# Patient Record
Sex: FEMALE | Race: WHITE | Employment: OTHER | URBAN - METROPOLITAN AREA
[De-identification: names, ages, dates, MRNs, and addresses within clinical notes are randomized per-mention and may not be internally consistent; named-entity substitution may affect disease eponyms.]

---

## 2017-03-23 ENCOUNTER — HOSPITAL ENCOUNTER (OUTPATIENT)
Dept: GENERAL RADIOLOGY | Age: 82
Discharge: HOME | End: 2017-03-23
Payer: MEDICARE

## 2017-03-23 DIAGNOSIS — M19.011 ARTHRITIS OF RIGHT SHOULDER REGION: ICD-10-CM

## 2017-03-23 PROCEDURE — 73030 X-RAY EXAM OF SHOULDER: CPT

## 2017-04-04 ENCOUNTER — HOSPITAL ENCOUNTER (OUTPATIENT)
Dept: CT IMAGING | Age: 82
Discharge: HOME | End: 2017-04-04
Attending: INTERNAL MEDICINE
Payer: MEDICARE

## 2017-04-04 DIAGNOSIS — R91.8 LUNG MASS: ICD-10-CM

## 2017-04-04 PROCEDURE — 71250 CT THORAX DX C-: CPT

## 2017-04-13 ENCOUNTER — DOCUMENTATION ONLY (OUTPATIENT)
Dept: ONCOLOGY | Age: 82
End: 2017-04-13

## 2017-04-13 NOTE — PROGRESS NOTES
new patient  Received: Yesterday       DO KAIN Galloway O[facility] Front Office                   Call from dr Bazan Notice about a patient   Maegan Sim [birth year]   i cannot find pt   Her phone is [phone]   She is 81 yo and has a new lung mass   Can yall find her chart and send to me? Thanks!

## 2017-04-14 ENCOUNTER — PATIENT OUTREACH (OUTPATIENT)
Dept: ONCOLOGY | Age: 82
End: 2017-04-14

## 2017-04-14 ENCOUNTER — OFFICE VISIT (OUTPATIENT)
Dept: ONCOLOGY | Age: 82
End: 2017-04-14

## 2017-04-14 VITALS
HEART RATE: 61 BPM | BODY MASS INDEX: 18.69 KG/M2 | DIASTOLIC BLOOD PRESSURE: 70 MMHG | RESPIRATION RATE: 18 BRPM | TEMPERATURE: 97.5 F | OXYGEN SATURATION: 98 % | WEIGHT: 112.2 LBS | SYSTOLIC BLOOD PRESSURE: 135 MMHG | HEIGHT: 65 IN

## 2017-04-14 DIAGNOSIS — Z87.891 HISTORY OF SMOKING GREATER THAN 50 PACK YEARS: ICD-10-CM

## 2017-04-14 DIAGNOSIS — J44.9 CHRONIC OBSTRUCTIVE PULMONARY DISEASE, UNSPECIFIED COPD TYPE (HCC): ICD-10-CM

## 2017-04-14 DIAGNOSIS — R91.8 MASS OF RIGHT LUNG: Primary | ICD-10-CM

## 2017-04-14 DIAGNOSIS — K58.9 IRRITABLE BOWEL SYNDROME, UNSPECIFIED TYPE: ICD-10-CM

## 2017-04-14 DIAGNOSIS — Z99.81 ON HOME OXYGEN THERAPY: ICD-10-CM

## 2017-04-14 RX ORDER — CEPHALEXIN 250 MG/1
CAPSULE ORAL
Refills: 1 | COMMUNITY
Start: 2017-03-08

## 2017-04-14 RX ORDER — AMLODIPINE BESYLATE 5 MG/1
TABLET ORAL
Refills: 1 | COMMUNITY
Start: 2017-02-19

## 2017-04-14 RX ORDER — ALBUTEROL SULFATE 90 UG/1
AEROSOL, METERED RESPIRATORY (INHALATION)
Refills: 1 | COMMUNITY
Start: 2017-03-08

## 2017-04-14 RX ORDER — ATORVASTATIN CALCIUM 20 MG/1
TABLET, FILM COATED ORAL
Refills: 1 | COMMUNITY
Start: 2017-02-20 | End: 2017-05-16 | Stop reason: ALTCHOICE

## 2017-04-14 RX ORDER — DICLOFENAC SODIUM 10 MG/G
GEL TOPICAL
Refills: 1 | COMMUNITY
Start: 2017-04-05

## 2017-04-14 RX ORDER — GUAIFENESIN 100 MG/5ML
81 LIQUID (ML) ORAL EVERY OTHER DAY
COMMUNITY
End: 2017-05-16 | Stop reason: ALTCHOICE

## 2017-04-14 RX ORDER — BISMUTH SUBSALICYLATE 262 MG
1 TABLET,CHEWABLE ORAL DAILY
COMMUNITY

## 2017-04-14 RX ORDER — HYDROCODONE BITARTRATE AND ACETAMINOPHEN 5; 325 MG/1; MG/1
1 TABLET ORAL
Qty: 60 TAB | Refills: 0 | Status: SHIPPED | OUTPATIENT
Start: 2017-04-14 | End: 2017-05-05 | Stop reason: ALTCHOICE

## 2017-04-14 RX ORDER — AZELASTINE HCL 205.5 UG/1
SPRAY NASAL
Refills: 1 | COMMUNITY
Start: 2017-03-08

## 2017-04-14 RX ORDER — MESALAMINE 800 MG/1
TABLET, DELAYED RELEASE ORAL
Refills: 2 | COMMUNITY
Start: 2017-03-09

## 2017-04-14 RX ORDER — CARVEDILOL 3.12 MG/1
TABLET ORAL
Refills: 0 | COMMUNITY
Start: 2017-02-09

## 2017-04-14 RX ORDER — DICLOFENAC SODIUM 50 MG/1
TABLET, DELAYED RELEASE ORAL
Refills: 0 | COMMUNITY
Start: 2017-03-23

## 2017-04-14 NOTE — Clinical Note
Message to  regarding biopsy  Need to obtain past imaging from [facility] EMERGENCY [facility] (disc) Message to . Needs follow up call Monday to determine if pain medication is effective. She is on my list for a follow up call.

## 2017-04-14 NOTE — MR AVS SNAPSHOT
Visit Information Date & Time Provider Department Dept. Phone Encounter #  
 4/14/2017 11:00 AM Kerry Turner, [address] [place] [address] Oncology at [facility] [phone] Follow-up Instructions Return in about 2 weeks (around 4/28/2017). Routing History Follow-up and Disposition History Upcoming Health Maintenance Date Due DTaP/Tdap/Td series (1 - Tdap) 3/15/1945 ZOSTER VACCINE AGE 60> 3/15/1984 GLAUCOMA SCREENING Q2Y 3/15/1989 OSTEOPOROSIS SCREENING (DEXA) 3/15/1989 Pneumococcal 65+ Low/Medium Risk (1 of 2 - PCV13) 3/15/1989 MEDICARE YEARLY EXAM 3/15/1989 INFLUENZA AGE 9 TO ADULT 8/1/2016 Allergies as of 4/14/2017  Review Complete On: 4/14/2017 By: Jasmin Pereira LPN Severity Noted Reaction Type Reactions Amoxicillin  04/14/2017    Rash Amoxicillin-pot Clavulanate  04/14/2017    Rash Ciprofloxacin  04/14/2017    Unknown (comments) Naproxen  04/14/2017    Unknown (comments) Noroxin [Norfloxacin]  04/14/2017    Rash Penicillins  04/14/2017    Unknown (comments) Sulfa (Sulfonamide Antibiotics)  04/14/2017    Unknown (comments) Current Immunizations  Never Reviewed No immunizations on file. Not reviewed this visit You Were Diagnosed With   
  
 Codes Comments Mass of right lung    -  Primary ICD-10-CM: R91.8 ICD-9-CM: 786.6 Irritable bowel syndrome, unspecified type     ICD-10-CM: K58.9 ICD-9-CM: 437.5 Chronic obstructive pulmonary disease, unspecified [facility]     ICD-10-CM: J44.9 ICD-9-CM: 898 On home oxygen therapy     ICD-10-CM: Z99.81 ICD-9-CM: V46.2 History of smoking greater than 50 pack years     ICD-10-CM: Z87.891 ICD-9-CM: V15.82 Vitals BP Pulse Temp Resp Height(growth percentile) Weight(growth percentile) 135/70 (BP 1 Location: Left arm, BP Patient Position: Sitting) 61 97.5 °F (36.4 °C) (Oral) 18 5' 5\" (1.651 m) 112 lb 3.2 oz (50.9 kg) SpO2 BMI OB Status Smoking Status 98% 18.67 kg/m2 Unknown Former Smoker Vitals History BMI and BSA Data Body Mass Index Body Surface Area  
 18.67 kg/m 2 1.53 m 2 Your Updated Medication List  
  
   
This list is accurate as of: 4/14/17 12:25 PM.  Always use your most recent med list.  
  
  
  
  
 ADVAIR DISKUS 100-50 mcg/dose diskus inhaler Generic drug:  fluticasone-salmeterol INHALE 1 PUFF BY MOUTH TWICE DAILY  
  
 amLODIPine 5 mg tablet Commonly known as:  Juan Durie TAKE 1 TABLET EVERY DAY  
  
 aspirin 81 mg chewable tablet Take 81 mg by mouth every other day. atorvastatin 20 mg tablet Commonly known as:  LIPITOR  
TAKE 1 TABLET BY MOUTH EVERY DAY Azelastine 0.15 % (205.5 mcg) nasal spray Commonly known as:  ASTEPRO  
INSTILL 2 SPRAYS INTO THE NOSTRILS TWICE DAILY  
  
 carvedilol 3.125 mg tablet Commonly known as:  COREG  
TAKE 1 TABLET BY MOUTH TWICE A DAY  
  
 * diclofenac EC 50 mg EC tablet Commonly known as:  VOLTAREN  
1 TABLET WITH FOOD OR MILK TWICE TIMES A DAY ORALLY 30 DAY(S)  
  
 * diclofenac 1 % Gel Commonly known as:  VOLTAREN  
APPLY 3 TIMES A DAY HYDROcodone-acetaminophen 5-325 mg per tablet Commonly known as:  [place] Take 1 Tab by mouth every eight (8) hours as needed for Pain. Max Daily Amount: 3 Tabs. Indications: Pain  
  
 mesalamine  mg DR tablet Commonly known as:  ASACOL HD  
TAKE 1 TABLET BY MOUTH 3 TIMES A DAY  
  
 multivitamin tablet Commonly known as:  ONE A DAY Take 1 Tab by mouth daily. PROAIR HFA 90 mcg/actuation inhaler Generic drug:  albuterol INHALE 2 PUFFS BY MOUTH EVERY 6 HOURS * Notice: This list has 2 medication(s) that are the same as other medications prescribed for you. Read the directions carefully, and ask your doctor or other care provider to review them with you. Prescriptions Printed Refills HYDROcodone-acetaminophen (NORCO) 5-325 mg per tablet 0 Sig: Take 1 Tab by mouth every eight (8) hours as needed for Pain. Max Daily Amount: 3 Tabs. Indications: Pain Class: Print Route: Oral  
  
We Performed the Following REFERRAL TO ONCOLOGY NURSE NAVIGATOR [DDS853 Custom] Comments:  
 Please evaluate patient for new lung mass/ get prior CXRs Follow-up Instructions Return in about 2 weeks (around 4/28/2017). To-Do List   
 04/14/2017 Imaging:  CT BX LUNG NDL PERC Referral Information Referral ID Referred By Referred To  
  
 [id number] Chaparrita Gilmore Not Available Visits Status Start Date End Date 1 New Request 4/14/17 4/14/18 If your referral has a status of pending review or denied, additional information will be sent to support the outcome of this decision. Introducing hospitals & HEALTH SERVICES! Sherrie Cobos introduces [employer] patient portal. Now you can access parts of your medical record, email your doctor's office, and request medication refills online. 1. In your internet browser, go to [email or web address] 2. Click on the First Time User? Click Here link in the Sign In box. You will see the New Member Sign Up page. 3. Enter your [employer] Access Code exactly as it appears below. You will not need to use this code after youve completed the sign-up process. If you do not sign up before the expiration date, you must request a new code. · [employer] Access Code: [email or web address] Expires: 6/21/2017 12:25 PM 
 
4. Enter the last four digits of your Social Security Number (xxxx) and Date of Birth (mm/dd/yyyy) as indicated and click Submit. You will be taken to the next sign-up page. 5. Create a Amiato ID. This will be your [employer]ato login ID and cannot be changed, so think of one that is secure and easy to remember. 6. Create a [employer] password. You can change your password at any time. 7. Enter your Password Reset Question and Answer. This can be used at a later time if you forget your password. 8. Enter your e-mail address. You will receive e-mail notification when new information is available in [address] 9. Click Sign Up. You can now view and download portions of your medical record. 10. Click the Download Summary menu link to download a portable copy of your medical information. If you have questions, please visit the Frequently Asked Questions section of the [employer] website. Remember, [employer] is NOT to be used for urgent needs. For medical emergencies, dial 911. Now available from your iPhone and Android! Please provide this summary of care documentation to your next provider. Your primary care clinician is listed as Gustavo Grady If you have any questions after today's visit, please call [phone].

## 2017-04-14 NOTE — PROGRESS NOTES
Carlos Eduardo Dumont is a 80 y.o. female new patient here today for upper right lobe lung cancer. Patient states she has pain 1/10 in right shoulder blade area.

## 2017-04-14 NOTE — PROGRESS NOTES
[employer]  Medical Oncology at [facility]   Nurse Navigator Note      Patient Name: Diamond Lockhart  YOB: 1924      Patient seen in consultation with Dr. Evan Guadalupe. Her daughter is present. Patient lives at [address] [place] [address]. She uses a Rolator for ambulation. Patient reports she stays active and plays tennis. Patient is a retired . Patient's grandson is an ED physician in [place], [place] and is helping support their understanding recent imaging and plan. Dr. Evan Guadalupe reviewed patient's history and recent imaging. They discussed the possibility of cancer and if patient would like to have a biopsy to prove this. Patient will move forward with a biopsy. Patient is not fearful of a cancer diagnosis. She would like to have her pain better managed. NN introduced self and role. Contact information provided. NN reviewed new patient folder and reinforced supportive therapies available through [facility]. Dr. Judi Garcia plan:  Assessment/Plan:     1. RUL lung mass on CT 6.1 cm in a long term smoker with chest wall extension/ no tissue dx of cancer.     Case d/w PCP. Reviewed scans with pt and family today.     Reviewed possibility of tissue diagnosis by CT guided biopsy.     Pt and family want to pursue doing biopsy and will order.     Pt and family want dx for prognostic info. Pt is not sure she will do anything if cancer dx.     Pt does have significant right shoulder/ chest wall pain likely related to mass.     So reasonable to figure out what dx is. Pt and family want to review any prior CXR and will see if we can get these reports.      2. Right shoulder/ chest wall pain. Local topical therapy helps some. Wants to try oral pain pill.     Ordered norco prn #60.     3. IBS/ AVR/ walker in facility. Per PCP.      Pt clinically is functional and has good QOL now.     Family support great.  Seen with navigator support today.     F/u here in 2 weeks after path back.     Call if questions. NN introduced self and role. Contact information provided. Reviewed new patient folder and discussed supportive therapies available through [facility]. Reviewed MyChart and encouraged activation for communication and to allow grandson to review medical records. Discussed Palliative Medicine and reviewed their handout. Encouraged patient to try 1/2 tab Norco to see how she tolerates this. Discussed she could take the other 1/2 if she did not see improvement in pain after an hour. We will follow up with patient regarding effectiveness of pain management plan next week. Reviewed process for scheduling biopsy with IR. Patient denies any barriers at this time. Patient and daughter verbalized understanding of above and agree to contact us with questions or concerns.

## 2017-04-14 NOTE — PROGRESS NOTES
NEW HEME/ONC CONSULT       Millie Disla is a 80 y.o. 3/15/1924 female and presents with New Patient (Upper right lobe cancer)    CC  Lung mass right 4/17    HPI  Pt seen today at request of PCP for lung mass. Pt had a shoulder xray for shoulder pain for several months which showed arthritis and a possible lung mass. CT chest without shows a RUL mass 6.1 cm. Pt is here with family today. Pt lives in [place]. Hx of AVR surgery and IBS. Uses a walker. Hx of greater than 50 yrs tobacco/ COPD and quit years ago. Pt c/o right shoulder / chest wall pain 5/10 occ. Trying topical NSAID and tylenol with not much help. Wants to try pain pill. DX   Encounter Diagnoses   Name Primary?  Mass of right lung Yes    Irritable bowel syndrome, unspecified type     Chronic obstructive pulmonary disease, unspecified COPD type ([facility]     On home oxygen therapy     History of smoking greater than 50 pack years         No past medical history on file. No past surgical history on file. Social History     Social History    Marital status:      Spouse name: N/A    Number of children: N/A    Years of education: N/A     Social History Main Topics    Smoking status: Former Smoker     Packs/day: 1.00    Smokeless tobacco: None    Alcohol use 0.6 oz/week     1 Glasses of wine per week      Comment: Once a week    Drug use: No    Sexual activity: No     Other Topics Concern    None     Social History Narrative    None     No family history on file.     Current Outpatient Prescriptions   Medication Sig Dispense Refill    PROAIR HFA 90 mcg/actuation inhaler INHALE 2 PUFFS BY MOUTH EVERY 6 HOURS  1    amLODIPine (NORVASC) 5 mg tablet TAKE 1 TABLET EVERY DAY  1    atorvastatin (LIPITOR) 20 mg tablet TAKE 1 TABLET BY MOUTH EVERY DAY  1    Azelastine (ASTEPRO) 0.15 % (205.5 mcg) nasal spray INSTILL 2 SPRAYS INTO THE NOSTRILS TWICE DAILY  1    carvedilol (COREG) 3.125 mg tablet TAKE 1 TABLET BY MOUTH TWICE A DAY  0    diclofenac (VOLTAREN) 1 % gel APPLY 3 TIMES A DAY  1    diclofenac EC (VOLTAREN) 50 mg EC tablet 1 TABLET WITH FOOD OR MILK TWICE TIMES A DAY ORALLY 30 DAY(S)  0    ADVAIR DISKUS 100-50 mcg/dose diskus inhaler INHALE 1 PUFF BY MOUTH TWICE DAILY  1    mesalamine DR (ASACOL HD) 800 mg DR tablet TAKE 1 TABLET BY MOUTH 3 TIMES A DAY  2    aspirin 81 mg chewable tablet Take 81 mg by mouth every other day.  multivitamin (ONE A DAY) tablet Take 1 Tab by mouth daily.  HYDROcodone-acetaminophen (NORCO) 5-325 mg per tablet Take 1 Tab by mouth every eight (8) hours as needed for Pain. Max Daily Amount: 3 Tabs. Indications: Pain 60 Tab 0       Allergies   Allergen Reactions    Amoxicillin Rash    Amoxicillin-Pot Clavulanate Rash    Ciprofloxacin Unknown (comments)    Naproxen Unknown (comments)    Noroxin [Norfloxacin] Rash    Penicillins Unknown (comments)    Sulfa (Sulfonamide Antibiotics) Unknown (comments)       Review of Systems    A comprehensive review of systems was negative except for: per HPI     Objective:  Visit Vitals    /70 (BP 1 Location: Left arm, BP Patient Position: Sitting)    Pulse 61    Temp 97.5 °F (36.4 °C) (Oral)    Resp 18    Ht 5' 5\" (1.651 m)    Wt 112 lb 3.2 oz (50.9 kg)    SpO2 98%    BMI 18.67 kg/m2         Physical Exam:   General appearance - frail WF in NAD  Mental status - awake, conversant  EYE-conj clear  Mouth - mucous membranes moist  Neck - supple, no significant adenopathy   Chest - clear to auscultation, no wheezes, rales or rhonchi, symmetric air entry   Heart - normal rate and regular rhythm   Abdomen - soft, nontender, nondistended, no masses or organomegaly   Ext-no pedal edema noted  Skin-Warm and dry.    Neuro -alert, oriented, normal speech, no focal findings / kyphosis/ uses walker      Diagnostic Imaging   reviewed  Results for orders placed during the hospital encounter of 03/23/17   XR SHOULDER RT AP/LAT MIN 2 V Narrative EXAM:  XR SHOULDER RT AP/LAT MIN 2 V    INDICATION:   arthritis of right shoulder. COMPARISON: Chest radiograph of 8/8/2010. FINDINGS: 2 views of the right shoulder demonstrate no fracture, dislocation or  other acute abnormality. There is hypertrophic change at the acromioclavicular  joint, with probable synovial calcification and hypertrophy along the superior  aspect of the joint. The subacromial space is preserved. There is mild to  moderate glenohumeral joint arthritis. There is asymmetric density in the lateral right lung apex. The right hilum  appears slightly elevated and these changes could be due to pleural and  parenchymal scarring. However, these findings were not clearly evident on prior  chest radiographs, at which time there was also slight elevation of the right  hilum. Therefore, findings are of concern for a right apical mass. There does  not appear to be gross associated bone destruction. Impression IMPRESSION:    1. No acute findings in the right shoulder. 2. Right acromioclavicular joint hypertrophy and glenohumeral joint hypertrophy. 3. Possible right apical lung mass. CT of the chest is recommended for further  evaluation. 23X             Results for orders placed during the hospital encounter of 04/04/17   CT CHEST WO CONT    Narrative INDICATION: LUNG MASS     Noncontrast CT of the chest is performed with 5 mm collimation. Coronal and  sagittal reformatted images were also performed. CT dose reduction was achieved through use of a standardized protocol tailored  for this examination and automatic exposure control for dose modulation. Direct comparison is made to prior chest x-ray dated August 2010. Chest:     Lungs: Within the right upper lung, there is patchy airspace consolidation with  air bronchograms.  There is peripheral right upper lung masslike consolidation  which extends laterally into the chest wall; conglomerate measurement is 3.9 cm  x 6.1 cm. The right lateral second rib which extends to this masslike  consolidation is somewhat mottled in appearance. There are severe bilateral  emphysematous changes. Several calcified granulomas are noted within the left  lung. Lymph nodes: There is no axillary, mediastinal or hilar lymphadenopathy. Heart: The heart is of normal size and there is no pericardial effusion. Pleura: There is no pleural fluid. Upper abdomen: There are multiple calcified granulomas within the spleen. Several hepatic granulomas are noted as well. There is bilateral renal cortical  scarring. There is a chronic calcification within the left adrenal gland. Impression IMPRESSION: Right upper lung patchy airspace consolidation and right upper lung  masslike consolidation which extends through the right lateral chest wall and  measures approximately 3.9 cm x 6.1 cm. Right lateral rib which extends to the  region of masslike consolidation has a somewhat mottled appearance. Findings are  worrisome for neoplastic process. Recommend correlation with patient history as  well as any prior cross-sectional imaging. CT PET scan can be performed for  further evaluation. Lesion is amenable to CT-guided percutaneous biopsy.     23x         Lab Results  reviewed  Lab Results   Component Value Date/Time    WBC 7.8 08/18/2010 04:20 AM    HGB 10.5 08/18/2010 04:20 AM    HCT 31.4 08/18/2010 04:20 AM    PLATELET 851 58/64/5532 04:20 AM    MCV 86.0 08/18/2010 04:20 AM       Lab Results   Component Value Date/Time    Sodium 137 08/18/2010 04:20 AM    Potassium 3.8 08/18/2010 04:20 AM    Chloride 101 08/18/2010 04:20 AM    CO2 22 08/18/2010 04:20 AM    Anion gap 14 08/18/2010 04:20 AM    Glucose 181 08/18/2010 04:20 AM    BUN 28 08/18/2010 04:20 AM    Creatinine 1.6 08/18/2010 04:20 AM    BUN/Creatinine ratio 18 08/18/2010 04:20 AM    GFR est AA 39 08/18/2010 04:20 AM    GFR est non-AA 32 08/18/2010 04:20 AM    Calcium 9.4 08/18/2010 04:20 AM AST (SGOT) 16 08/18/2010 04:20 AM    Alk. phosphatase 96 08/18/2010 04:20 AM    Protein, total 7.2 08/18/2010 04:20 AM    Albumin 3.4 08/18/2010 04:20 AM    Globulin 3.8 08/18/2010 04:20 AM    A-G Ratio 0.9 08/18/2010 04:20 AM    ALT (SGPT) 17 08/18/2010 04:20 AM       .    Assessment/Plan:    1.  RUL lung mass on CT 6.1 cm in a long term smoker with chest wall extension/ no tissue dx of cancer. Case d/w PCP. Reviewed scans with pt and family today. Reviewed possibility of tissue diagnosis by CT guided biopsy. Pt and family want to pursue doing biopsy and will order. Pt and family want dx for prognostic info. Pt is not sure she will do anything if cancer dx. Pt does have significant right shoulder/ chest wall pain likely related to mass. So reasonable to figure out what dx is. Pt and family want to review any prior CXR and will see if we can get these reports. 2.  Right shoulder/ chest wall pain. Local topical therapy helps some. Wants to try oral pain pill. Ordered norco prn #60.    3. IBS/ AVR/ walker in facility. Per PCP. Pt clinically is functional and has good QOL now. Family support great. Seen with navigator support today. F/u here in 2 weeks after path back. Call if questions. ICD-10-CM ICD-9-CM    1. Mass of right lung R91.8 786.6 REFERRAL TO ONCOLOGY NURSE NAVIGATOR      CT BX LUNG NDL PERC   2. Irritable bowel syndrome, unspecified type K58.9 564.1 REFERRAL TO ONCOLOGY NURSE NAVIGATOR      CT BX LUNG NDL PERC   3. Chronic obstructive pulmonary disease, unspecified COPD type ([facility] J44.9 496 REFERRAL TO ONCOLOGY NURSE NAVIGATOR      CT BX LUNG NDL PERC   4. On home oxygen therapy Z99.81 V46.2 REFERRAL TO ONCOLOGY NURSE NAVIGATOR      CT BX LUNG NDL PERC   5.  History of smoking greater than 50 pack years Z87.891 V15.82 REFERRAL TO ONCOLOGY NURSE NAVIGATOR      CT BX LUNG NDL PERC       Current Outpatient Prescriptions   Medication Sig    PROAIR HFA 90 mcg/actuation inhaler INHALE 2 PUFFS BY MOUTH EVERY 6 HOURS    amLODIPine (NORVASC) 5 mg tablet TAKE 1 TABLET EVERY DAY    atorvastatin (LIPITOR) 20 mg tablet TAKE 1 TABLET BY MOUTH EVERY DAY    Azelastine (ASTEPRO) 0.15 % (205.5 mcg) nasal spray INSTILL 2 SPRAYS INTO THE NOSTRILS TWICE DAILY    carvedilol (COREG) 3.125 mg tablet TAKE 1 TABLET BY MOUTH TWICE A DAY    diclofenac (VOLTAREN) 1 % gel APPLY 3 TIMES A DAY    diclofenac EC (VOLTAREN) 50 mg EC tablet 1 TABLET WITH FOOD OR MILK TWICE TIMES A DAY ORALLY 30 DAY(S)    ADVAIR DISKUS 100-50 mcg/dose diskus inhaler INHALE 1 PUFF BY MOUTH TWICE DAILY    mesalamine DR (ASACOL HD) 800 mg DR tablet TAKE 1 TABLET BY MOUTH 3 TIMES A DAY    aspirin 81 mg chewable tablet Take 81 mg by mouth every other day.  multivitamin (ONE A DAY) tablet Take 1 Tab by mouth daily.  HYDROcodone-acetaminophen (NORCO) 5-325 mg per tablet Take 1 Tab by mouth every eight (8) hours as needed for Pain. Max Daily Amount: 3 Tabs. Indications: Pain     No current facility-administered medications for this visit. There are no Patient Instructions on file for this visit. Follow-up Disposition:  Return in about 2 weeks (around 4/28/2017).     Marissa Sahu DO

## 2017-04-20 ENCOUNTER — HOSPITAL ENCOUNTER (OUTPATIENT)
Dept: CT IMAGING | Age: 82
Discharge: HOME | End: 2017-04-20
Attending: INTERNAL MEDICINE
Payer: MEDICARE

## 2017-04-20 VITALS
WEIGHT: 110 LBS | OXYGEN SATURATION: 98 % | SYSTOLIC BLOOD PRESSURE: 139 MMHG | HEART RATE: 70 BPM | RESPIRATION RATE: 16 BRPM | DIASTOLIC BLOOD PRESSURE: 29 MMHG | BODY MASS INDEX: 18.78 KG/M2 | HEIGHT: 64 IN

## 2017-04-20 DIAGNOSIS — J44.9 CHRONIC OBSTRUCTIVE PULMONARY DISEASE, UNSPECIFIED COPD TYPE (HCC): ICD-10-CM

## 2017-04-20 DIAGNOSIS — K58.9 IRRITABLE BOWEL SYNDROME, UNSPECIFIED TYPE: ICD-10-CM

## 2017-04-20 DIAGNOSIS — Z99.81 ON HOME OXYGEN THERAPY: ICD-10-CM

## 2017-04-20 DIAGNOSIS — R91.8 MASS OF RIGHT LUNG: ICD-10-CM

## 2017-04-20 DIAGNOSIS — Z87.891 HISTORY OF SMOKING GREATER THAN 50 PACK YEARS: ICD-10-CM

## 2017-04-20 PROCEDURE — 74011250636 HC RX REV CODE- 250/636

## 2017-04-20 PROCEDURE — 88341 IMHCHEM/IMCYTCHM EA ADD ANTB: CPT | Performed by: INTERNAL MEDICINE

## 2017-04-20 PROCEDURE — 77030018781

## 2017-04-20 PROCEDURE — 77030003666 HC NDL SPINAL BD -A

## 2017-04-20 PROCEDURE — 77030003503 HC NDL BIOP TISS BD -B

## 2017-04-20 PROCEDURE — 74011250636 HC RX REV CODE- 250/636: Performed by: RADIOLOGY

## 2017-04-20 PROCEDURE — 88342 IMHCHEM/IMCYTCHM 1ST ANTB: CPT | Performed by: INTERNAL MEDICINE

## 2017-04-20 PROCEDURE — 88377 M/PHMTRC ALYS ISHQUANT/SEMIQ: CPT | Performed by: INTERNAL MEDICINE

## 2017-04-20 PROCEDURE — 88173 CYTOPATH EVAL FNA REPORT: CPT | Performed by: INTERNAL MEDICINE

## 2017-04-20 PROCEDURE — 32405 CT BX LUNG NDL PERC: CPT

## 2017-04-20 RX ORDER — FENTANYL CITRATE 50 UG/ML
200 INJECTION, SOLUTION INTRAMUSCULAR; INTRAVENOUS
Status: DISCONTINUED | OUTPATIENT
Start: 2017-04-20 | End: 2017-04-20

## 2017-04-20 RX ORDER — SODIUM CHLORIDE 0.9 % (FLUSH) 0.9 %
10 SYRINGE (ML) INJECTION AS NEEDED
Status: DISCONTINUED | OUTPATIENT
Start: 2017-04-20 | End: 2017-04-20

## 2017-04-20 RX ORDER — MIDAZOLAM HYDROCHLORIDE 1 MG/ML
5 INJECTION, SOLUTION INTRAMUSCULAR; INTRAVENOUS
Status: DISCONTINUED | OUTPATIENT
Start: 2017-04-20 | End: 2017-04-20

## 2017-04-20 RX ORDER — SODIUM CHLORIDE 9 MG/ML
500 INJECTION, SOLUTION INTRAVENOUS CONTINUOUS
Status: DISCONTINUED | OUTPATIENT
Start: 2017-04-20 | End: 2017-04-20

## 2017-04-20 RX ORDER — MIDAZOLAM HYDROCHLORIDE 1 MG/ML
INJECTION, SOLUTION INTRAMUSCULAR; INTRAVENOUS
Status: DISCONTINUED
Start: 2017-04-20 | End: 2017-04-20

## 2017-04-20 RX ADMIN — MIDAZOLAM HYDROCHLORIDE 1 MG: 1 INJECTION, SOLUTION INTRAMUSCULAR; INTRAVENOUS at 13:31

## 2017-04-20 RX ADMIN — FENTANYL CITRATE 25 MCG: 50 INJECTION, SOLUTION INTRAMUSCULAR; INTRAVENOUS at 13:18

## 2017-04-20 RX ADMIN — MIDAZOLAM HYDROCHLORIDE 1 MG: 1 INJECTION, SOLUTION INTRAMUSCULAR; INTRAVENOUS at 13:48

## 2017-04-20 RX ADMIN — MIDAZOLAM HYDROCHLORIDE 0.5 MG: 1 INJECTION, SOLUTION INTRAMUSCULAR; INTRAVENOUS at 13:50

## 2017-04-20 RX ADMIN — SODIUM CHLORIDE 500 ML: 900 INJECTION, SOLUTION INTRAVENOUS at 13:18

## 2017-04-20 RX ADMIN — FENTANYL CITRATE 25 MCG: 50 INJECTION, SOLUTION INTRAMUSCULAR; INTRAVENOUS at 13:50

## 2017-04-20 RX ADMIN — FENTANYL CITRATE 50 MCG: 50 INJECTION, SOLUTION INTRAMUSCULAR; INTRAVENOUS at 13:26

## 2017-04-20 NOTE — DISCHARGE INSTRUCTIONS
[address] [facility]  Department of Interventional Radiology  [facility]  [phone]  BIOPSY DISCHARGE INSTRUCTIONS    General Instructions:     A biopsy is the removal of a small piece of tissue for microscopic examination or testing. Healthy tissue can be obtained for the purpose of tissue-type matching for transplants. Unhealthy tissues are more commonly biopsied to diagnose disease. General Biopsy:     A mass can grow in any area of the body, and we are taking a specimen as ordered by your doctor. The risks are the same. They include bleeding, pain, and infection. Home Care Instructions: You may resume your regular diet and medication regimen. Do not drink alcohol, drive, or make any important legal decisions in the next 24 hours. Do not lift anything heavier than a gallon of milk until the soreness goes away. You may use over the counter acetaminophen or ibuprofen for the soreness. You may apply an ice pack to the affected area for 20-30 minutes at time for the first 24 hours. After that, you may apply a heat pack. Call If: You should call your Physician and/or the Radiology Nurse if you have any questions or concerns about the biopsy site. Call if you should have increased pain, fever, redness, drainage, or bleeding more than a small spot on the bandage. Follow-Up Instructions: Please see your ordering doctor as he/she has requested. To Reach Us: Side effects of sedation medications and other medications used today have been reviewed. Notify us of nausea, itching, hives, dizziness, or anything else out of the ordinary. Should you experience any of these significant changes, please call [phone] between the hours of 7:30 am and 10 pm or [phone] after hours.  After hours, ask the  to page the [address] Technologist, and describe the problem to the technologist.            Patient Signature:  Date: 4/20/2017  Discharging Nurse: Kishor Ramirez RN

## 2017-04-25 ENCOUNTER — PATIENT OUTREACH (OUTPATIENT)
Dept: ONCOLOGY | Age: 82
End: 2017-04-25

## 2017-04-25 NOTE — PROGRESS NOTES
[address]  Medical Oncology at [facility]   Nurse Navigator Note          Daughter had sent following message via [employer]:  My mother is tolerating the pain medication well. Beny Amezquita is being very stoic and miserly with dosage and is feeling significant pain in between dosages.  My brothers and I try to let her make her own decisions and encourage her to take the medicine when she needs it rather than stretching it out to 8 hours when she is in obvious pain.  We would really appreciate your calling her to encourage her to take the pain meds based on how she feels. Shadi Ek number is [phone]. Beny Amezquita is also concerned that she may run out before next Friday (5/5) at her next appointment. Dianna Blakely will happily come by the office and  another prescription if she needs it.     Thanks, [address] [facility] with patient. She is taking her pain medication every 8 hours. Her right shoulder pain continues. Prior to taking her pain medication her pain is a level 7 an hour after taking Norco her pain is level 5 and stays there for hours then creeps back up to 7. She has reduced her activity due to the pain and is skipping trips to weekly chair exercises. Patient last BM was yesterday, but it had been days since she had gone. Her stool was hard. Encouraged patient start Miralax as normally recommended by provider. Patient will start today. Reviewed importance of preventing constipation while on opioids and need to take daily med to prevent constipation. NN to forward above to provider and nursing for further directions for pain relief.

## 2017-04-26 ENCOUNTER — DOCUMENTATION ONLY (OUTPATIENT)
Dept: ONCOLOGY | Age: 82
End: 2017-04-26

## 2017-04-26 NOTE — PROGRESS NOTES
Received call from patient. She reports she is continuing to have pain. She picked up the \"powder\" for constipation. She woke up at 3AM this morning in pain. She took 1/2 tablet of hydrocodone and 1 teaspoon of the powder. She had a large bowel movement at 9:00 this morning. She took 1/2 hydrocodone at 1 tsp Miralax at 9:00 as well. She reports now at 4:30, she took another pill of hydrocodone due to continued shoulder pain. She is planning to take a full tablet of hydrocodone again before going to bed tonight. She reports it is controlling the pain when she takes a full tablet of hydrocodone but is concerned that her appointment here is not until 5/5/17. She reports a full tablet sometimes makes her feel a little drowsy but she does not feel unsteady or weak and feels she is tolerating it well. She is also doing Voltaran three times daily. Encouraged to continue hydrocodone-acetaminophen 1 tablet every 8 hours as needed for pain as prescribed if this is helping. Encouraged daily Miralax which patient started today. She would like an appointment earlier next week. Will call back tomorrow to discuss appointment time further.

## 2017-04-30 ENCOUNTER — DOCUMENTATION ONLY (OUTPATIENT)
Dept: ONCOLOGY | Age: 82
End: 2017-04-30

## 2017-04-30 NOTE — PROGRESS NOTES
Called on call doctor and asked if could take pain pill early  Check on pt / pain mgmt  Maybe need palliative care

## 2017-05-01 ENCOUNTER — DOCUMENTATION ONLY (OUTPATIENT)
Dept: ONCOLOGY | Age: 82
End: 2017-05-01

## 2017-05-01 DIAGNOSIS — R91.8 MASS OF RIGHT LUNG: Primary | ICD-10-CM

## 2017-05-01 NOTE — PROGRESS NOTES
HIPAA verified. Stated has pain right shoulder and is hesitant to take norco due to side effects. Describes pain as intermittent and sharp/3-4 now. Stated is too early for pain pill. Discussed palliative care referral and patient is agreeable. Gave palliative care contact info/need referral.  Advised would forward to provider.

## 2017-05-02 ENCOUNTER — PATIENT OUTREACH (OUTPATIENT)
Dept: ONCOLOGY | Age: 82
End: 2017-05-02

## 2017-05-02 ENCOUNTER — TELEPHONE (OUTPATIENT)
Dept: ONCOLOGY | Age: 82
End: 2017-05-02

## 2017-05-02 NOTE — Clinical Note
Gwyn Forbes, patient comes in this Friday. If you have a chance to meet with her that would be great.

## 2017-05-02 NOTE — TELEPHONE ENCOUNTER
Patient is independently living at [facility], when the nurse checked on her this morning around 0730 she was in extreme pain. She does not have an appetite, she is using oxygen, and she took 1 Tramadol this morning. Does not want to go to the hospital. Would like to speak with the nurse before any treatment.   Thank you,  Ny Parra

## 2017-05-02 NOTE — PROGRESS NOTES
Consulted with NP, Alma Reyna.  Patient may take Norco 5/325 every 6 hours as needed. Spoke with patient and reviewed above. Patient repeated instructions back. She understands she should contact our office if pain is unmanaged with this change.

## 2017-05-02 NOTE — PROGRESS NOTES
[address] [facility] Oncology at [facility]   Nurse Navigator Note        Patient had left message for NN requesting a call back regarding scheduling late afternoon 5/1/17. NN reviewed patient's chart and noted call regarding pain management issue. NN spoke with NP and agreed NN would contact pt to evaluate. Patient reports normal pain in back and shoulder. She denies taking tramadol. She believes Tramadol gives her diarrhea. Patient reports she is taking her Norco as prescribed. Patient states after she takes one tab of Norco her pain is relieved for about 4-6 hours. Patient's last BM today. \"I do not want to go to the hospital.\" Patient stated the NP at her assisted living facility had recommended she go to the hospital.  Patient stated her only other issue beside pain returning prior to scheduled time for pain med is a diminished appetite. Patient reported she only likes eating granola bars. NN reviewed referral to Palliative Medicine and how they can support patient with symptom management. NN reinforced importance of protein in patient's diet. Discussed many bars available that have additional protein. NN to forward referral to dietician for support. NN to review above with provider and discuss possibility of frequency change. NN reviewed upcoming appointment with patient. Patient is anxious to meet with provider and discuss findings. Patient will bring her son and daughter to upcoming appointment. Our office to contact patient today before close of business regarding opioid scheduling. Patient verbalized understanding and appreciation.

## 2017-05-04 ENCOUNTER — DOCUMENTATION ONLY (OUTPATIENT)
Dept: ONCOLOGY | Age: 82
End: 2017-05-04

## 2017-05-04 NOTE — TELEPHONE ENCOUNTER
Spoke with Sahra Rosen, nurse at [facility]. She reports she assessed patient this morning & pain was controlled at that time. Nurse feels patient is not taking pain medication routinely. States she spoke with patients daughter this morning about need to transition patient to healthcare unit of facility. Patient is living independently at present & could benefit from staff assist in healthcare. Nurse reports patient refuses to wear oxygen when she leaves facility for outings. States patient had removed oxygen during night last night & upon assessment this morning, 02 sat was 67% on room air. Oxygen replaced & 02 sat to 98% within a few minutes per nurse. Advised nurse of patient's scheduled office visit here tomorrow & per provider, patient may increase pain medication to every 4-6 hours if needed. Nurse verbalized understanding.

## 2017-05-04 NOTE — TELEPHONE ENCOUNTER
Call returned to nurse Lizet at [facility]. Message left on voicemail to return call to office. Per provider, patient may increase frequency of Hydrocodone/Acet. 5/325 mg to one tab every 4-6 hours. Patient scheduled for office visit tomorrow. Call placed to patient to check on condition. Message left to return call to office.

## 2017-05-04 NOTE — TELEPHONE ENCOUNTER
Nadine, the nurse from [address] [place] [address] called stating she has noticed patient has had increased pain and increased weakness.  Please call Ahsan Smith at [phone]

## 2017-05-05 ENCOUNTER — PATIENT OUTREACH (OUTPATIENT)
Dept: ONCOLOGY | Age: 82
End: 2017-05-05

## 2017-05-05 ENCOUNTER — OFFICE VISIT (OUTPATIENT)
Dept: ONCOLOGY | Age: 82
End: 2017-05-05

## 2017-05-05 ENCOUNTER — DOCUMENTATION ONLY (OUTPATIENT)
Dept: ONCOLOGY | Age: 82
End: 2017-05-05

## 2017-05-05 VITALS
RESPIRATION RATE: 16 BRPM | WEIGHT: 108 LBS | DIASTOLIC BLOOD PRESSURE: 51 MMHG | BODY MASS INDEX: 18.44 KG/M2 | SYSTOLIC BLOOD PRESSURE: 138 MMHG | HEART RATE: 61 BPM | TEMPERATURE: 97.9 F | HEIGHT: 64 IN | OXYGEN SATURATION: 92 %

## 2017-05-05 DIAGNOSIS — C34.11 MALIGNANT NEOPLASM OF UPPER LOBE OF RIGHT LUNG (HCC): Primary | ICD-10-CM

## 2017-05-05 DIAGNOSIS — M79.601 RIGHT ARM PAIN: ICD-10-CM

## 2017-05-05 DIAGNOSIS — M25.511 ACUTE PAIN OF RIGHT SHOULDER: ICD-10-CM

## 2017-05-05 DIAGNOSIS — Z99.81 ON HOME OXYGEN THERAPY: ICD-10-CM

## 2017-05-05 RX ORDER — OXYCODONE HYDROCHLORIDE 5 MG/1
5 CAPSULE ORAL
Qty: 100 CAP | Refills: 0 | Status: SHIPPED | OUTPATIENT
Start: 2017-05-05

## 2017-05-05 NOTE — PROGRESS NOTES
HEME/ONC CONSULT       Quique Granados is a 80 y.o. 3/15/1924 female and presents with Lung Cancer    CC  Lung mass right 4/17    HPI  Pt seen today for f/u and new lung cancer by FNA of rib lesion. 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 Family / pt here to discuss today. Pt is unsure about what therapy she would want to do. Does not want chemo. Considering palliative care/ hospice. In assisted living at THE [facility]. Last visit: request of PCP for lung mass. Pt had a shoulder xray for shoulder pain for several months which showed arthritis and a possible lung mass. CT chest without shows a RUL mass 6.1 cm. Pt is here with family today. Pt [facility] in [facility]. Hx of AVR surgery and IBS. Uses a walker. Hx of greater than 50 yrs tobacco/ COPD and quit years ago. Pt c/o right shoulder / chest wall pain 5/10 occ. Trying topical NSAID and tylenol with not much help. Wants to try pain pill. DX   Encounter Diagnoses   Name Primary?  Malignant neoplasm of upper lobe of right lung (HCC) Yes    Acute pain of right shoulder     Right arm pain     On home oxygen therapy         No past medical history on file. No past surgical history on file. Social History     Social History    Marital status:      Spouse name: N/A    Number of children: N/A    Years of education: N/A     Social History Main Topics    Smoking status: Former Smoker     Packs/day: 1.00    Smokeless tobacco: None    Alcohol use 0.6 oz/week     1 Glasses of wine per week      Comment: Once a week    Drug use: No    Sexual activity: No     Other Topics Concern    None     Social History Narrative     No family history on file. Current Outpatient Prescriptions   Medication Sig Dispense Refill    oxyCODONE (OXYIR) 5 mg capsule Take 1 Cap by mouth every four (4) hours as needed. Max Daily Amount: 30 mg.  Indications: Pain 100 Cap 0    amLODIPine (NORVASC) 5 mg tablet TAKE 1 TABLET EVERY DAY  1    atorvastatin (LIPITOR) 20 mg tablet TAKE 1 TABLET BY MOUTH EVERY DAY  1    carvedilol (COREG) 3.125 mg tablet TAKE 1 TABLET BY MOUTH TWICE A DAY  0    diclofenac (VOLTAREN) 1 % gel APPLY 3 TIMES A DAY  1    diclofenac EC (VOLTAREN) 50 mg EC tablet 1 TABLET WITH FOOD OR MILK TWICE TIMES A DAY ORALLY 30 DAY(S)  0    mesalamine DR (ASACOL HD) 800 mg DR tablet TAKE 1 TABLET BY MOUTH 3 TIMES A DAY  2    aspirin 81 mg chewable tablet Take 81 mg by mouth every other day.  multivitamin (ONE A DAY) tablet Take 1 Tab by mouth daily.  PROAIR HFA 90 mcg/actuation inhaler INHALE 2 PUFFS BY MOUTH EVERY 6 HOURS  1    Azelastine (ASTEPRO) 0.15 % (205.5 mcg) nasal spray INSTILL 2 SPRAYS INTO THE NOSTRILS TWICE DAILY  1    ADVAIR DISKUS 100-50 mcg/dose diskus inhaler INHALE 1 PUFF BY MOUTH TWICE DAILY  1       Allergies   Allergen Reactions    Amoxicillin Rash    Amoxicillin-Pot Clavulanate Rash    Ciprofloxacin Unknown (comments)    Naproxen Unknown (comments)    Noroxin [Norfloxacin] Rash    Penicillins Unknown (comments)    Sulfa (Sulfonamide Antibiotics) Unknown (comments)       Review of Systems    A comprehensive review of systems was negative except for: per HPI     Objective:  Visit Vitals    /51    Pulse 61    Temp 97.9 °F (36.6 °C) (Oral)    Resp 16    Ht 5' 4\" (1.626 m)    Wt 108 lb (49 kg)    SpO2 92%    BMI 18.54 kg/m2         Physical Exam:   General appearance - frail WF in NAD  Mental status - awake, conversant  EYE-conj clear  Mouth - mucous membranes moist  Neck - supple, no significant adenopathy   Chest - clear to auscultation, no wheezes, rales or rhonchi, symmetric air entry   Heart - normal rate and regular rhythm   Abdomen - soft, nontender, nondistended, no masses or organomegaly   Ext-no pedal edema noted/ right arm pain with movement/ minimal weakness RUE  Skin-Warm and dry.    Neuro -alert, oriented, normal speech, no focal findings / kyphosis/ uses walker      Diagnostic Imaging   reviewed  Results for orders placed during the hospital encounter of 03/23/17   XR SHOULDER RT AP/LAT MIN 2 V    Narrative EXAM:  XR SHOULDER RT AP/LAT MIN 2 V    INDICATION:   arthritis of right shoulder. COMPARISON: Chest radiograph of 8/8/2010. FINDINGS: 2 views of the right shoulder demonstrate no fracture, dislocation or  other acute abnormality. There is hypertrophic change at the acromioclavicular  joint, with probable synovial calcification and hypertrophy along the superior  aspect of the joint. The subacromial space is preserved. There is mild to  moderate glenohumeral joint arthritis. There is asymmetric density in the lateral right lung apex. The right hilum  appears slightly elevated and these changes could be due to pleural and  parenchymal scarring. However, these findings were not clearly evident on prior  chest radiographs, at which time there was also slight elevation of the right  hilum. Therefore, findings are of concern for a right apical mass. There does  not appear to be gross associated bone destruction. Impression IMPRESSION:    1. No acute findings in the right shoulder. 2. Right acromioclavicular joint hypertrophy and glenohumeral joint hypertrophy. 3. Possible right apical lung mass. CT of the chest is recommended for further  evaluation. 23X             Results for orders placed during the hospital encounter of 04/20/17   CT BX LUNG NDL PERC    Narrative CLINICAL HISTORY: Destructive mass of the right second rib, with extension into  the right chest wall and right pleural space. COMPARISON: CT chest 4/7/2017. PROCEDURE: CT-guided right chest wall mass biopsy. CT dose reduction was  achieved through use of a standardized protocol tailored for this examination  and automatic exposure control for dose modulation.  [Billing note only, not part  of diagnostic report: DMPI - though ordered as a right upper lobe lung mass  biopsy, the mass is in the right chest wall, and the lung was not entered. Please bill for right chest wall mass biopsy.]    OPERATORS: Tammi Murillo M.D.     ESTIMATED BLOOD LOSS: None. ANESTHESIA: buffered lidocaine local anesthetic. The patient was evaluated and  felt to be an appropriate candidate for conscious sedation. Please see  preprocedure assessment and nursing record for full documentation. Sedation was  achieved with Versed and Fentanyl, and continuous monitoring was performed. TECHNIQUE AND FINDINGS:  The patient's relevant allergies, medications, laboratory results, and history  were reviewed with the patient and her son. The patient was identified by name  and date of birth. The procedure, benefits, risks, and alternatives (including  not having the procedure) were discussed. All of the patient's questions were  answered. Informed verbal and written consent was obtained. Focused CT was performed of the upper chest, localizing the destructive right  second rib mass. The skin site was marked. A time-out procedure using two  patient identifiers was performed and laterality confirmed. An anterior right  upper chest approach was chosen and the skin was prepped and draped using  maximum sterile barrier. Subcutaneous and deep tissues were heavily anesthetized  with local anesthetic (approximately 10 cc). An 18-gauge trocar needle was  advanced into the mass, and this was extremely painful. Additional local  anesthetic was provided, though repeat attempt was also painful. The patient was  sedated. Under CT fluoroscopic guidance, 3 fine-needle aspirates were obtained  with 22-gauge spinal needles. Under CT-fluoroscopic guidance, 2 core biopsy  samples were obtained with an 18-gauge core biopsy device, submitted to the  on-site pathologist, and sample adequacy confirmed. The first sample measured  approximately 20 mm and the second measured approximately 12 mm. There were no  immediate complications.  The patient tolerated the procedure well. Postprocedure diagnosis: Adenocarcinoma. Impression IMPRESSION:  Uncomplicated CT-guided right second rib mass biopsy. Lab Results  reviewed  Lab Results   Component Value Date/Time    WBC 7.8 08/18/2010 04:20 AM    HGB 10.5 08/18/2010 04:20 AM    HCT 31.4 08/18/2010 04:20 AM    PLATELET 778 62/87/4921 04:20 AM    MCV 86.0 08/18/2010 04:20 AM       Lab Results   Component Value Date/Time    Sodium 137 08/18/2010 04:20 AM    Potassium 3.8 08/18/2010 04:20 AM    Chloride 101 08/18/2010 04:20 AM    CO2 22 08/18/2010 04:20 AM    Anion gap 14 08/18/2010 04:20 AM    Glucose 181 08/18/2010 04:20 AM    BUN 28 08/18/2010 04:20 AM    Creatinine 1.6 08/18/2010 04:20 AM    BUN/Creatinine ratio 18 08/18/2010 04:20 AM    GFR est AA 39 08/18/2010 04:20 AM    GFR est non-AA 32 08/18/2010 04:20 AM    Calcium 9.4 08/18/2010 04:20 AM    AST (SGOT) 16 08/18/2010 04:20 AM    Alk. phosphatase 96 08/18/2010 04:20 AM    Protein, total 7.2 08/18/2010 04:20 AM    Albumin 3.4 08/18/2010 04:20 AM    Globulin 3.8 08/18/2010 04:20 AM    A-G Ratio 0.9 08/18/2010 04:20 AM    ALT (SGPT) 17 08/18/2010 04:20 AM       .    Assessment/Plan:    1. 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 FNA of rib lesion shows met adenocarcinoma c/w lung primary. Reviewed path with pt and family today. Markers ordered on 4/20 but not done yet (to see if any pill options). Pt does have significant right shoulder/ chest wall / arm pain likely related to mass. Pain has gotten worse over time and pain meds helping but not completely. Long d/w family today about next step. Reviewed no surgery as pt does not want this. Reviewed palliative radiation and possible targeted therapy options if markers good. Markers pending. Pt and family want to d/w rad/onc and will refer.     Also will refer to palliative care as we openly discussed possible hospice. Will get navigator today to help with coordinations of services for pain mgmt. 2.  Right shoulder/ chest wall pain. Local topical therapy helps some. Tried norco and not strong enough. rx for oxycodone 5mg q4h prn. Pt is concerned about taking too much pain meds. 3.  IBS/ AVR/ walker in facility/ in independent living. Per PCP. Family support great. Navigator help today. F/u here in 4 weeks if needed    Call if questions. ICD-10-CM ICD-9-CM    1. Malignant neoplasm of upper lobe of right lung (HCC) C34.11 162.3 REFERRAL TO ONCOLOGY NURSE NAVIGATOR      REFERRAL TO RADIATION ONCOLOGY      REFERRAL TO PALLIATIVE MEDICINE   2. Acute pain of right shoulder M25.511 719.41 REFERRAL TO RADIATION ONCOLOGY      REFERRAL TO PALLIATIVE MEDICINE   3. Right arm pain M79.601 729.5 REFERRAL TO RADIATION ONCOLOGY      REFERRAL TO PALLIATIVE MEDICINE   4. On home oxygen therapy Z99.81 V46.2 REFERRAL TO PALLIATIVE MEDICINE       Current Outpatient Prescriptions   Medication Sig    oxyCODONE (OXYIR) 5 mg capsule Take 1 Cap by mouth every four (4) hours as needed. Max Daily Amount: 30 mg. Indications: Pain    amLODIPine (NORVASC) 5 mg tablet TAKE 1 TABLET EVERY DAY    atorvastatin (LIPITOR) 20 mg tablet TAKE 1 TABLET BY MOUTH EVERY DAY    carvedilol (COREG) 3.125 mg tablet TAKE 1 TABLET BY MOUTH TWICE A DAY    diclofenac (VOLTAREN) 1 % gel APPLY 3 TIMES A DAY    diclofenac EC (VOLTAREN) 50 mg EC tablet 1 TABLET WITH FOOD OR MILK TWICE TIMES A DAY ORALLY 30 DAY(S)    mesalamine DR (ASACOL HD) 800 mg DR tablet TAKE 1 TABLET BY MOUTH 3 TIMES A DAY    aspirin 81 mg chewable tablet Take 81 mg by mouth every other day.  multivitamin (ONE A DAY) tablet Take 1 Tab by mouth daily.     PROAIR HFA 90 mcg/actuation inhaler INHALE 2 PUFFS BY MOUTH EVERY 6 HOURS    Azelastine (ASTEPRO) 0.15 % (205.5 mcg) nasal spray INSTILL 2 SPRAYS INTO THE NOSTRILS TWICE DAILY    ADVAIR DISKUS 100-50 mcg/dose diskus inhaler INHALE 1 PUFF BY MOUTH TWICE DAILY     No current facility-administered medications for this visit. There are no Patient Instructions on file for this visit. Follow-up Disposition:  Return in about 4 weeks (around 6/2/2017).     Abbey Cuenca DO

## 2017-05-08 ENCOUNTER — TELEPHONE (OUTPATIENT)
Dept: PALLATIVE CARE | Age: 82
End: 2017-05-08

## 2017-05-08 NOTE — TELEPHONE ENCOUNTER
Called pt to schedule outpatient Palliative Medicine appt. Pt offered next available appt on May 16 at 9:30am or 11:30am.  Pt says she will check with [facility] transportation and call back.

## 2017-05-08 NOTE — PROGRESS NOTES
Call to [facility] @ [phone]. PDL1/keytruda resulted and Dr. Jeramy Myles to result sometime today. EGFR has 2 days to result. Forwarded to provider.

## 2017-05-08 NOTE — TELEPHONE ENCOUNTER
Ms. [place] Gallardo is calling to get address for office. Patient has spoken to Ayana Mcguire who offered pt appt for 5/16/17 [facility] at 9:30am with Dr. Viktoriya Becker. Advised Ms. Gan of our office address and scheduled pt for date/time requested.

## 2017-05-09 ENCOUNTER — TELEPHONE (OUTPATIENT)
Dept: ONCOLOGY | Age: 82
End: 2017-05-09

## 2017-05-09 NOTE — TELEPHONE ENCOUNTER
Rad Onc appt  [phone]  5/25/17  2pm  Dr. Suzi Villalba in front of hospital.    Allow 2 hours for visit

## 2017-05-15 ENCOUNTER — TELEPHONE (OUTPATIENT)
Dept: PALLATIVE CARE | Age: 82
End: 2017-05-15

## 2017-05-15 NOTE — TELEPHONE ENCOUNTER
Called patient to advise/confirm upcoming appt with Dr. Seven Henning on 05/16/17. Also advised to please bring in patients photo ID/[employer] card and any current pain medication patient is taking to bring in the container with the medication in it.

## 2017-05-16 ENCOUNTER — TELEPHONE (OUTPATIENT)
Dept: ONCOLOGY | Age: 82
End: 2017-05-16

## 2017-05-16 ENCOUNTER — OFFICE VISIT (OUTPATIENT)
Dept: PALLATIVE CARE | Age: 82
End: 2017-05-16

## 2017-05-16 VITALS
WEIGHT: 107.2 LBS | OXYGEN SATURATION: 95 % | HEART RATE: 59 BPM | TEMPERATURE: 95.4 F | SYSTOLIC BLOOD PRESSURE: 128 MMHG | DIASTOLIC BLOOD PRESSURE: 43 MMHG | HEIGHT: 64 IN | RESPIRATION RATE: 16 BRPM | BODY MASS INDEX: 18.3 KG/M2

## 2017-05-16 DIAGNOSIS — M25.511 ACUTE PAIN OF RIGHT SHOULDER: Primary | ICD-10-CM

## 2017-05-16 DIAGNOSIS — C34.90 METASTATIC LUNG CANCER (METASTASIS FROM LUNG TO OTHER SITE), UNSPECIFIED LATERALITY (HCC): ICD-10-CM

## 2017-05-16 DIAGNOSIS — R53.0 NEOPLASTIC MALIGNANT RELATED FATIGUE: ICD-10-CM

## 2017-05-16 DIAGNOSIS — M79.601 RIGHT ARM PAIN: ICD-10-CM

## 2017-05-16 RX ORDER — OXYCODONE HCL 20 MG/ML
7.5 CONCENTRATE, ORAL ORAL
Qty: 30 ML | Refills: 0 | Status: SHIPPED | OUTPATIENT
Start: 2017-05-16 | End: 2017-05-23 | Stop reason: SDUPTHER

## 2017-05-16 RX ORDER — LIDOCAINE 50 MG/G
1 PATCH TOPICAL EVERY 12 HOURS
Qty: 15 PATCH | Refills: 5 | Status: SHIPPED | OUTPATIENT
Start: 2017-05-16 | End: 2017-06-15

## 2017-05-16 NOTE — PATIENT INSTRUCTIONS
Dear Quique Granados ,    It was a pleasure seeing you in the Palliative Medicine Office today. This is what we talked about:     1. Right shoulder/ back and arm pain  - This is likely from the tumor eroding the rib causing pain. You are on Oxycodone 5 mg every 4 hours- take 5 doses per day with good pain relief but would like for better pain control to improve quality of life. - We agreed to increase the dose to Oxycodone 7.5 mg and try the liquid form for ease of use. Do not take the tablets while you are on liquid Oxycodone. I am giving you a 15 day supply just to see how you do and if this works your son can  a script later. - Please take Tylenol 650 mg three times a day after your meals to help with pain. - You are applying Diclofenac gel on your upper back which helps. Please refill the medicine. - I prescribed Lidocaine 5% patch to be applied in the back as well to see if this adds to the relief. 2. Appetite/ eating  - You have had poor appetite for many many years and do not want to try anything for it.  - You are still able to eat. 3. Goals of care  - We discussed the immune marker results today and you share that you do not want chemotherapy or immunotherapy regardless of outcome. - You are considering radiation therapy only because it might help with pain. We discussed this in detail and you have agreed to keep the appointment with Dr. Melani Johns on May 25th. - We discussed Hospice care, difference between Palliative and Hospice in detail, what my role, Amadeo Valentine and Eduar's role will be once to transition to Hospice. You would like Dr. Hunter Santillan and me to remain involved and are okay with Hospice RN reaching out to us for symptom management if need be. - I wanted to discuss your wishes regarding CPR/ Intubation- DNR but you got tired and so I will continue that conversation the next time I see you.     This is what you have shared with us about Advance Care Planning  No flowsheet data found. The Palliative Medicine Team is here to support you and your family. We will see you again in 3 weeks. Our Nurse Navigator  will check in with you by phone before that time. If there are any concerns before that time, such as medication questions, worsening symptoms or a need to see a physician for an urgent or emergent situation; please call [phone] (COPE). A physician is also on call after our normal business hours of 8am to 5pm.     In order to serve you better, please allow up to 48 hours for prescription refills to be processed. Certain medications may require more paperwork or a written prescription that you may need to  from the office. We appreciate you letting us know of any refill requests as soon as possible. We also would like you to sign up for [facility] as well.     Sincerely,      Silverio Hernandez MD and the Palliative Medicine Team

## 2017-05-16 NOTE — TELEPHONE ENCOUNTER
Voicemail:    5/10/17 @ 4:43pm    Patient's son called to obtain results for patient's add-on biopsy. He requesting to speak to Lilly Oliver.

## 2017-05-16 NOTE — PROGRESS NOTES
Palliative Medicine Outpatient Services  [place]: [phone] ([phone])    Patient Name: Earl Salazar  YOB: 1924    Date of Current Visit: 05/16/17  Location of Current Visit:    [x] [facility] Office  [] [facility] Office  [] [facility] Office  [] Home  [] Other:      Date of Initial Visit: 5/16/17  Requesting Physician: Dr. Abiodun Rodriguez  Primary Care Physician: Bren Oneill MD      SUMMARY:   Earl Salazar is a 80y.o. year old with a past history of COPD with 50+ years of smoking, with newly identified lung mass diagnosed as adenocarcinoma of the lung after biopsy of the rib lesion, unfavorable immunomarkers who was referred to Palliative Medicine by Dr. Abiodun Rodriguez for management of pain and care decisions. 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 She is very social, loves to go for walks in the sun, used to play Golf, tennis. Palliative Medicine is addressing the following current patient/family concerns: pain management and care decisions. PALLIATIVE DIAGNOSES:       ICD-10-CM ICD-9-CM    1. Acute pain of right shoulder M25.511 719.41    2. Right arm pain M79.601 729.5    3. Neoplastic malignant related fatigue R53.0 780.79    4. Metastatic lung cancer (metastasis from lung to other site), unspecified laterality C34.90 162.9           PLAN:   Patient Instructions   Dear Earl Salazar ,    It was a pleasure seeing you in the Palliative Medicine Office today. This is what we talked about:     1. Right shoulder/ back and arm pain  - This is likely from the tumor eroding the rib causing pain. You are on Oxycodone 5 mg every 4 hours- take 5 doses per day with good pain relief but would like for better pain control to improve quality of life. - We agreed to increase the dose to Oxycodone 7.5 mg and try the liquid form for ease of use.  Do not take the tablets while you are on liquid Oxycodone. I am giving you a 15 day supply just to see how you do and if this works your son can  a script later. - Please take Tylenol 650 mg three times a day after your meals to help with pain. - You are applying Diclofenac gel on your upper back which helps. Please refill the medicine. - I prescribed Lidocaine 5% patch to be applied in the back as well to see if this adds to the relief. 2. Appetite/ eating  - You have had poor appetite for many many years and do not want to try anything for it.  - You are still able to eat. 3. Goals of care  - We discussed the immune marker results today and you share that you do not want chemotherapy or immunotherapy regardless of outcome. - You are considering radiation therapy only because it might help with pain. We discussed this in detail and you have agreed to keep the appointment with Dr. Marcus Ruiz on May 25th. - We discussed Hospice care, difference between Palliative and Hospice in detail, what my role, Amadeo Valentine and Eduar's role will be once to transition to Hospice. You would like Dr. Gould Carry and me to remain involved and are okay with Hospice RN reaching out to us for symptom management if need be. - I wanted to discuss your wishes regarding CPR/ Intubation- DNR but you got tired and so I will continue that conversation the next time I see you. This is what you have shared with us about Advance Care Planning  No flowsheet data found. The Palliative Medicine Team is here to support you and your family. We will see you again in 3 weeks. Our Nurse Navigator  will check in with you by phone before that time. If there are any concerns before that time, such as medication questions, worsening symptoms or a need to see a physician for an urgent or emergent situation; please call [phone] (COPE).  A physician is also on call after our normal business hours of 8am to 5pm.     In order to serve you better, please allow up to 48 hours for prescription refills to be processed. Certain medications may require more paperwork or a written prescription that you may need to  from the office. We appreciate you letting us know of any refill requests as soon as possible. We also would like you to sign up for [facility] as well. Sincerely,      Kobe Rahman MD and the Palliative Medicine Team      Counseling and Coordination:   - Hospice discussion  - Opioid counseling       GOALS OF CARE / TREATMENT PREFERENCES:   [====Goals of Care====]  GOALS OF CARE:  Patient / health care proxy stated goals: FULL      TREATMENT PREFERENCES:   Code Status:  [x] Attempt Resuscitation       [] Do Not Attempt Resuscitation    Advance Care Planning:  Advance Care Planning 2017   Patient's Healthcare Decision Maker is: Legal Next of Casa Carbone   Primary Decision Maker Name Lukas Ramirez   Primary Decision Maker Phone Number [phone]   Primary Decision Maker Relationship to Patient Adult child       Other:  (If patient appropriate for POST, consider using PALLPOST smart phrase here)    The palliative care team has discussed with patient / health care proxy about goals of care / treatment preferences for patient.  [====Goals of Care====]         PRESCRIPTIONS GIVEN:     Medications Ordered Today   Medications    oxyCODONE (ROXICODONE INTENSOL) 20 mg/mL concentrated solution     Sig: Take 0.38 mL by mouth every four (4) hours as needed for Pain. Max Daily Amount: 45.6 mg.     Dispense:  30 mL     Refill:  0    lidocaine (LIDODERM) 5 %     Si Patch by TransDERmal route every twelve (12) hours for 30 days.  Apply to affected area for 12 hours at a time     Dispense:  15 Patch     Refill:  5           FOLLOW UP:     Future Appointments  Date Time Provider Janine Catalina   2017 2:00 PM RAD [address] [facility] [address] [facility]. JESÚS'TERRI PEREIRA   2017 11:30 AM Kobe Rahman MD [facility] [address] PHYSICIANS INVOLVED IN CARE:   Patient Care Team:  Jodi Avery MD as PCP - General (Internal Medicine)  Juan R Calle DO (Oncology)  Stone Galeana RN as Nurse Extern       HISTORY:   Nursing documentation from date of visit reviewed. Reviewed patient-completed ESAS and advance care planning form. Reviewed patient record in prescription monitoring program.    CHIEF COMPLAINT: fatigue    HPI/SUBJECTIVE:    The patient is: [x] Verbal / [] Nonverbal     Very pleasant and bright patient, appears younger than stated age. Here with her son Lela Hagan and daughter Gina Jackson. Patient uses rollator to walk, O2 at night. Very social.  Pain- is the most disturbing and limiting factor. Taking Oxycodone 5 mg for pain of 8/10 and gets relief of 4/10 which is okay with her. On bad days she sleeps but is up and about the other days. Likes to go to [facility] and socialize with her friends. She has a vibrant personality, wants quality more than quantity of life. She is willing to consider RT just for that reason. Her family is very involved in her care. They are all in agreement with Hospice and understand why Hospice sign up will have to wait until after completion of RT. They do understand that if she declines before than at any point they can have Hospice.   Clinical Pain Assessment (nonverbal scale for nonverbal patients):   [++++ Clinical Pain Assessment++++]  [++++Pain Severity++++]: Pain: 8  [++++Pain Character++++]: sharp, gnawing  [++++Pain Duration++++]:chronic  [++++Pain Effect++++]:functional  [++++Pain Factors++++]:movement  [++++Pain Frequency++++]:constant, relieved with medicine  [++++Pain Location++++]:right shoulder, arm, chest wall  [++++ Clinical Pain Assessment++++]       FUNCTIONAL ASSESSMENT:     Palliative Performance Scale (PPS):  PPS: 60       PSYCHOSOCIAL/SPIRITUAL SCREENING:     Any spiritual / Rastafari concerns:  [] Yes /  [x] No    Caregiver Burnout:  [] Yes /  [x] No /  [] No Caregiver Present      Anticipatory grief assessment:   [x] Normal  / [] Maladaptive       ESAS Anxiety: Anxiety: 0    ESAS Depression: Depression: 0       REVIEW OF SYSTEMS:     The following systems were [] reviewed / [] unable to be reviewed  Systems: constitutional, ears/nose/mouth/throat, respiratory, gastrointestinal, genitourinary, musculoskeletal, integumentary, neurologic, psychiatric, endocrine. Positive findings noted below. Modified ESAS Completed by: provider   Fatigue: 9 Drowsiness: 8   Depression: 0 Pain: 8   Anxiety: 0 Nausea: 0   Anorexia: 6 Dyspnea: 8   Best Well-Bein Constipation: No   Other Problem (Comment): 0          PHYSICAL EXAM:     Wt Readings from Last 3 Encounters:   17 107 lb 3.2 oz (48.6 kg)   17 108 lb (49 kg)   17 110 lb (49.9 kg)     Blood pressure 128/43, pulse (!) 59, temperature 95.4 °F (35.2 °C), temperature source Oral, resp. rate 16, height 5' 4\" (1.626 m), weight 107 lb 3.2 oz (48.6 kg), SpO2 95 %. Last bowel movement: See Nursing Note    Constitutional: alert, oriented  Eyes: pupils equal, anicteric  ENMT: no nasal discharge, moist mucous membranes  Cardiovascular: regular rhythm, distal pulses intact  Respiratory: breathing not labored, symmetric  Gastrointestinal: soft non-tender, +bowel sounds  Musculoskeletal: no deformity, tenderness to palpation over the right chest wall. Right arm movement limited due to pain  Skin: warm, dry  Neurologic: following commands, moving all extremities  Psychiatric: full affect, no hallucinations  Other:       HISTORY:     History reviewed. No pertinent past medical history. History reviewed. No pertinent surgical history. History reviewed. No pertinent family history. History reviewed, no pertinent family history.   Social History   Substance Use Topics    Smoking status: Former Smoker     Packs/day: 1.00    Smokeless tobacco: Not on file    Alcohol use 0.6 oz/week     1 Glasses of wine per week Comment: Once a week     Allergies   Allergen Reactions    Amoxicillin Rash    Amoxicillin-Pot Clavulanate Rash    Ciprofloxacin Unknown (comments)    Naproxen Unknown (comments)    Noroxin [Norfloxacin] Rash    Penicillins Unknown (comments)    Sulfa (Sulfonamide Antibiotics) Unknown (comments)      Current Outpatient Prescriptions   Medication Sig    oxyCODONE (ROXICODONE INTENSOL) 20 mg/mL concentrated solution Take 0.38 mL by mouth every four (4) hours as needed for Pain. Max Daily Amount: 45.6 mg.    lidocaine (LIDODERM) 5 % 1 Patch by TransDERmal route every twelve (12) hours for 30 days. Apply to affected area for 12 hours at a time    oxyCODONE (OXYIR) 5 mg capsule Take 1 Cap by mouth every four (4) hours as needed. Max Daily Amount: 30 mg. Indications: Pain    PROAIR HFA 90 mcg/actuation inhaler INHALE 2 PUFFS BY MOUTH EVERY 6 HOURS    amLODIPine (NORVASC) 5 mg tablet TAKE 1 TABLET EVERY DAY    Azelastine (ASTEPRO) 0.15 % (205.5 mcg) nasal spray INSTILL 2 SPRAYS INTO THE NOSTRILS TWICE DAILY    carvedilol (COREG) 3.125 mg tablet TAKE 1 TABLET BY MOUTH TWICE A DAY    diclofenac (VOLTAREN) 1 % gel APPLY 3 TIMES A DAY    ADVAIR DISKUS 100-50 mcg/dose diskus inhaler INHALE 1 PUFF BY MOUTH TWICE DAILY    mesalamine DR (ASACOL HD) 800 mg DR tablet TAKE 1 TABLET BY MOUTH 3 TIMES A DAY    multivitamin (ONE A DAY) tablet Take 1 Tab by mouth daily.  diclofenac EC (VOLTAREN) 50 mg EC tablet 1 TABLET WITH FOOD OR MILK TWICE TIMES A DAY ORALLY 30 DAY(S)     No current facility-administered medications for this visit.            LAB DATA REVIEWED:     Lab Results   Component Value Date/Time    WBC 7.8 08/18/2010 04:20 AM    HGB 10.5 08/18/2010 04:20 AM    PLATELET 853 71/94/4141 04:20 AM     Lab Results   Component Value Date/Time    Sodium 137 08/18/2010 04:20 AM    Potassium 3.8 08/18/2010 04:20 AM    Chloride 101 08/18/2010 04:20 AM    CO2 22 08/18/2010 04:20 AM    BUN 28 08/18/2010 04:20 AM Creatinine 1.6 08/18/2010 04:20 AM    Calcium 9.4 08/18/2010 04:20 AM    Magnesium 2.0 10/23/2009 11:50 AM      Lab Results   Component Value Date/Time    AST (SGOT) 16 08/18/2010 04:20 AM    Alk.  phosphatase 96 08/18/2010 04:20 AM    Protein, total 7.2 08/18/2010 04:20 AM    Albumin 3.4 08/18/2010 04:20 AM    Globulin 3.8 08/18/2010 04:20 AM     Lab Results   Component Value Date/Time    INR 1.0 08/17/2010 11:55 AM    Prothrombin time 10.4 08/17/2010 11:55 AM    aPTT 28.2 08/17/2010 11:55 AM      No results found for: IRON, FE, TIBC, IBCT, PSAT, FERR        CONTROLLED SUBSTANCES SAFETY ASSESSMENT (IF ON CONTROLLED SUBSTANCES):     Reviewed opioid safety handout:  [x] Yes   [] No  24 hour opioid dose >150mg morphine equivalent/day:  [] Yes   [x] No  Benzodiazepines:  [] Yes   [x] No  Sleep apnea:  [] Yes   [x] No  Urine Toxicology Testing within last 6 months:  [] Yes   [x] No  History of or new aberrant medication taking behaviors:  [] Yes   [x] No            Total time: 70m  Counseling / coordination time: 60m  > 50% counseling / coordination?:

## 2017-05-16 NOTE — TELEPHONE ENCOUNTER
Message recd by nursing today. Message to provider for path review and contact with family. EGFR none detected  Keytruda expressed    To provider.

## 2017-05-16 NOTE — PROGRESS NOTES
Palliative Medicine Office Visit  Palliative Medicine Nurse Check In  [phone]    Patient Name: Nesha Read  YOB: 1924      Date of Office Visit: 5/16/2017    Patient states: \"  \"    From Check In Sheet (scanned in Media):  Has a medical provider talked with you about cardiopulmonary resuscitation (CPR)? [] Yes   [] No   [] Unable to obtain    Nurse reminder to complete or update ACP FlowSheet:  Advance Care Planning 5/16/2017   Patient's Healthcare Decision Maker is: Legal Next of Casa [address]   Primary Decision Maker Name Cassandra Hernandez   Primary Decision Maker Phone Number [phone]   Primary Decision Maker Relationship to Patient Adult child   Secondary Decision Maker Name [address] Phone Number [phone]   Secondary Decision Maker Relationship to Patient Adult child   Confirm Advance Directive Yes, not on file   Does the patient have other document types Do Not Resuscitate; Power of              Is there anything that we should know about you as a person in order to provide you the best care possible? Have you been to the ER, urgent care clinic since your last visit? [] Yes   [x] No   [] Unable to obtain     Have you been hospitalized since your last visit? [] Yes   [x] No   [] Unable to obtain    Have you seen or consulted any other health care providers outside of the [address] [place] [address] since your last visit?    [] Yes   [x] No   [] Unable to obtain    Functional status (describe):         Last BM: 5/15/10616       accessed (date): 5/16/2017    Bottle review (for opioid pain medication):  Medication 1:   Date filled:   Directions:   # filled:   # left:   # pills taking per day:  Last dose taken:    Medication 2:   Date filled:   Directions:   # filled:   # left:   # pills taking per day:  Last dose taken:    Medication 3:   Date filled:   Directions:   # filled:   # left:   # pills taking per day:  Last dose taken:     Medication 4:   Date filled:   Directions:   # filled:   # left:   # pills taking per day:  Last dose taken:

## 2017-05-17 ENCOUNTER — TELEPHONE (OUTPATIENT)
Dept: PALLATIVE CARE | Age: 82
End: 2017-05-17

## 2017-05-17 NOTE — PROGRESS NOTES
Spoke with chris earlier. Please see telephone encounter. Patient and family are aware of oncology recommendation.

## 2017-05-17 NOTE — PROGRESS NOTES
Tell pt all testing negative for any oral drug or immune therapy. No options other than chemo.   Continue supportive care only as pt is doing

## 2017-05-17 NOTE — TELEPHONE ENCOUNTER
Oscar Hennessy is calling regarding pts appt with Dr. Flavia Arana scheduled for 5/25/17. Wants to reschedule since they have conflict. Called Radiation oncology spoke to [facility] with Dr. David George office. They have opening for tomorrow 5/18/17 at 11:00am.  Pt needs to be in outpatient registration in [address] by 11:00am and bring a list of medications. Relayed to information to Monique regarding appt  Date/Time/everything works for them.

## 2017-05-17 NOTE — TELEPHONE ENCOUNTER
Call returned to son. HIPAA verified. Stated was seen by palliative care yesterday. Aware that chemo is not option. Stated plan is for palliative care/possible radiation then hospice when needed. Son appreciative of call and follow up. Advised would forward to provider.

## 2017-05-18 ENCOUNTER — HOSPITAL ENCOUNTER (OUTPATIENT)
Dept: RADIATION THERAPY | Age: 82
Discharge: HOME | End: 2017-05-18

## 2017-05-20 ENCOUNTER — DOCUMENTATION ONLY (OUTPATIENT)
Dept: PALLATIVE CARE | Age: 82
End: 2017-05-20

## 2017-05-20 NOTE — PROGRESS NOTES
Palliative Medicine on-call:    Called by pt's dtr Maria Elena Fraser- her mother has COPD, lung adenocarcinoma and saw our clinic for the first time this week. Goals at this time are to get palliative chemo and then transition to hospice. Pt is at [address] [place] [address] independent [facility] and earlier this week was able to do her ADLs. Today very lethargic and needed help getting to the kitchen table, only eating a few bites. Now went back to bed. Has no specific complaints- no F/C, N/V, SOB, pain w/ urination. Discussed options w/ Karla Alstrom- as unfortunately our team does not have ability to make weekend visits/clinic appts to assess pt. She can take her to ED, but she really wants to avoid hospitalization/ED visits. I ask her to call the [address] [place] [address] RN to take vitals and then they can make an in-person assessment. If vitals are unstable, MAY require ED visit as pt may need IVF, w/u for infection, etc. If they are ok, encourage dtr to keep a close eye on pt, push po fluids and small bites. Pain in rib okay- she is taking Oxycodone as prescribed. The liquid formula is new, but the medication is not- so I dont' think this is the reason for her change in condition. I want to make sure that pt is strong enough for palliative XRT which should help her sx and were goals of pt and family. May require ED visit and admission, then [facility] can assess in house for less tx. Has her first visit to [facility] w/ Dr Cheatham Show on Monday- but she may be too weak. Dtr will pass on our # to [facility], or call me w/ other concerns. If I dont' hear back, I will make sure our NN team checks in w/ her on Monday. Dtr wishes that there was something more Palliative team could do over weekend, but understands and is appreciative of conversation.

## 2017-05-21 ENCOUNTER — DOCUMENTATION ONLY (OUTPATIENT)
Dept: PALLATIVE CARE | Age: 82
End: 2017-05-21

## 2017-05-22 ENCOUNTER — HOSPICE ADMISSION (OUTPATIENT)
Dept: HOSPICE | Facility: HOSPICE | Age: 82
End: 2017-05-22

## 2017-05-22 ENCOUNTER — TELEPHONE (OUTPATIENT)
Dept: PALLATIVE CARE | Age: 82
End: 2017-05-22

## 2017-05-22 ENCOUNTER — NURSE NAVIGATOR (OUTPATIENT)
Dept: PALLATIVE CARE | Age: 82
End: 2017-05-22

## 2017-05-22 ENCOUNTER — DOCUMENTATION ONLY (OUTPATIENT)
Dept: PALLATIVE CARE | Age: 82
End: 2017-05-22

## 2017-05-22 DIAGNOSIS — C34.31 MALIGNANT NEOPLASM OF LOWER LOBE OF RIGHT LUNG (HCC): Primary | ICD-10-CM

## 2017-05-22 NOTE — TELEPHONE ENCOUNTER
Erik Harp is calling to advise that family has decided to go with [facility]. She states that Casa Luevano, from [facility],is going to be calling to get information. Erik Harp advise that they would like to have this referral done as soon as possible since they want to have meeting today if possible. Please call her back with any questions.

## 2017-05-22 NOTE — PROGRESS NOTES
Spoke with daughter Andreia Pepe regarding weekend events. Mrs. Denton Carnes has declined albeit pain is better with 7.5mg Oxycodone. She is debilitated and wants to lay in bed. Family and patient has decided to move her into the health care facility of [address] [place] [address]. Family is yet to decide on Hospice facility.   I have requested them to call us with the choice so we can make the referral.

## 2017-05-22 NOTE — PROGRESS NOTES
Call placed to Nina/[facility]/intake nurse. She state she did receive verbal order from Nurse Oriana Orellana but will need and order. Order placed for Hospice.

## 2017-05-22 NOTE — TELEPHONE ENCOUNTER
Elizabeth Villa is calling to get Verbal order for pt to go to health care until. [place] over weekend and family is going to start talking to hospice company's.

## 2017-05-22 NOTE — TELEPHONE ENCOUNTER
Jay Bazan with [facility] is calling to advise that Patient is now in healthcare. Patient came over with oxcodone mediation. Jay Bazan is requesting a new script for their pharmacy since it is not packaged where they can measure it out. Please fax over new script to [phone].

## 2017-05-22 NOTE — TELEPHONE ENCOUNTER
Order for Hospice services faxed to [facility] fax # [phone] attn: Benson/admission nurse. Office note 5.16.17 faxed.

## 2017-05-22 NOTE — TELEPHONE ENCOUNTER
Returned call to Nadine/nurse/[facility]/[phone] advised she was with a patient. Order to transfer patient to skilled nursing [facility] given to [facility]. Advised we will put [facility]. Cassius Torres verbalized understanding.

## 2017-05-23 ENCOUNTER — NURSE NAVIGATOR (OUTPATIENT)
Dept: PALLATIVE CARE | Age: 82
End: 2017-05-23

## 2017-05-23 RX ORDER — OXYCODONE HCL 20 MG/ML
7.5 CONCENTRATE, ORAL ORAL
Qty: 30 ML | Refills: 0 | Status: SHIPPED | OUTPATIENT
Start: 2017-05-23

## 2017-05-23 NOTE — TELEPHONE ENCOUNTER
Prescription for oxycodone 0.38 ml by mouth every four hours as needed for pain was sent to the pharmacy at [facility]. Received confirmation that the fax went through.

## 2017-05-24 ENCOUNTER — NURSE NAVIGATOR (OUTPATIENT)
Dept: PALLATIVE CARE | Age: 82
End: 2017-05-24

## 2017-08-22 PROBLEM — Z71.89 ACP (ADVANCE CARE PLANNING): Status: ACTIVE | Noted: 2017-08-22

## 2018-03-02 NOTE — PROGRESS NOTES
Palliative Medicine:    Called by dtr Shannan Sellers- pt is continuing to decline, in a lot of pain in her R side and arm/shoulder. Taking the Oxycodone 7.5mg liquid every 2-3 hours. Vitals were stable yest when [facility] RN checked them. Hard to even get out of bed. Shannan Sellers, her brothers and pt would like hospice to start tmrw, and move into the Healthcare section of [address] [place] [address]. She is in touch w/ the admin team there, as she is not certain how this works. [facility]. Decision to not go to [facility] tmrw, too hard for her to move. We did discuss possible hospital admission to get sx under control and rad onc eval in person, but pt wants to stay out of the hospital if at all possible. Told Shannan Sellers that pt can take 10mg Oxycodone (0.5ml) every 2-3 hours prn and I am calling in Dex 2mg daily to her [facility] ([phone]) to also help w/ bone pain. Crush up w/ applesauce. It is hard for her to take meds so did not take her BP meds today- this is okay, in fact her BP may be a bit on the lower side since she has poor po intake. I spoke w/ Nick Olsno RN w/ hospice- cannot do admission today but hopefully tmrw and will have the RN call dtr tmrw before 10am to set things up. I called back Shannan Sellers to let her know- if things severely worsen overnight, told her she can certainly call me but that at times we do recommend 911 to go to hospital for IP hospice eval and IV pain meds. Right now pt sleeping and eating a bit, so I hope will be okay. Pt returned our call and was given message below and would like to go ahead and have this done.  Can we please put in the orders for her.

## 2020-05-26 NOTE — PROGRESS NOTES
Add on path faxed to [facility]/complete. To PSR for scanning.
Patient baseline mental status/Alert and oriented to person, place and time

## 2022-06-08 NOTE — TELEPHONE ENCOUNTER
Call returned. Unable to speak with nurse. Message left on VM to return call or contact PCP. To provider. Pt walks in to triage c/o dysuria. Pt reports Dx with UTI last Saturday, prescribed amoxicillin but had allergic reaction to it. Seen pcp told to come to ED to be evaluated by ID for stronger antibiotic. Denies fevers or chills.